# Patient Record
Sex: MALE | Race: WHITE | Employment: FULL TIME | URBAN - METROPOLITAN AREA
[De-identification: names, ages, dates, MRNs, and addresses within clinical notes are randomized per-mention and may not be internally consistent; named-entity substitution may affect disease eponyms.]

---

## 2022-11-15 ENCOUNTER — HOSPITAL ENCOUNTER (OUTPATIENT)
Dept: NUTRITION | Age: 37
Discharge: HOME | End: 2022-11-18
Payer: COMMERCIAL

## 2022-11-15 PROCEDURE — 97802 MEDICAL NUTRITION INDIV IN: CPT

## 2022-12-05 ENCOUNTER — HOSPITAL ENCOUNTER (OUTPATIENT)
Dept: NUTRITION | Age: 37
Discharge: HOME | End: 2022-12-08
Payer: COMMERCIAL

## 2022-12-05 PROCEDURE — 97803 MED NUTRITION INDIV SUBSEQ: CPT

## 2022-12-05 NOTE — PROGRESS NOTES
Lenard Miranda MS, RD, LD  Outpatient Registered Dietitian  [facility]  Phone: [phone]  Fax: [phone]    FOLLOW UP ASSESSMENT (12/5/22): Sierra Nails is a 43 y.o. male referred with the following diagnosis (es): Hyperlipidemia, unspecified [E78.5]     Previous SMART Goals:  Continue to track meals via MyFitnessPal: MET  Construct meals to abide by MyPlate Guidelines: Progressing  30 min HIIT training sessions 4 days / week (Mon-Thurs) + family walk on weekend:  MET  Limit saturated fat intake to <20g / day:  MET  Consume 35 - 40 g total fiber / day (5-10g soluble fiber):  MET  2g plant sterols / stanols / day:  MET    Diet Recall:  B:  1c mixed fruit (apples, bananas, blueberries)    L:  \"Mexican Bowl\" (1/4c lettuce + 3 oz grilled chicken + bell pepper + 1&1/2 c black beans + 1 c mexican rice)    D:  1 slice Red [place] pizza    Fluid intake: 64 oz water    ** Red meat intake <1x week since last encounter    Physical Activity:  4x 30 min HIIT sessions / week      Estimated Nutrition Needs:  MSJ x 1.3  (2413kcal/d)      Protein: 121g/day (20%)      DIAGNOSIS:  Unbalanced diet related to nutrition knowledge deficit as evidenced by eating pattern as above. INTERVENTION:  New SMART Goals:   Eliminate all trans fats from diet  Consume Protein source w/ q meal  Increase fatty fish intake to 2-3x per week    Nutrition Education and Counseling (30 minutes):   Specific Topics Discussed:   - Sources of trans fats and negative impact on lipid profile  - Sources of omega-3 FA's  - importance of regular protein intake w/ resistance training to maintain positive nitrogen balance. Patient voiced understanding and agreement.       MONITORING & EVALUATION:  Monitor Food and Nutrient Intake, Physical Activity and Function, Anthropometrics, and Biochemical    Follow up: DAVE Miranda MS, RD, CHRISTIAN  ______________________________________________________________________      Eileen Aguirre, MS, RD, LD  Outpatient Registered Dietitian  [facility]  Phone: [phone]  Fax: [phone]     ASSESSMENT  Dariana Herman is a 43 y.o. male referred with the following diagnosis (es): Hyperlipidemia, unspecified [E78.5]      PMH includes HLD. Surgical Hx: Mastoidectomy (), Labrum repair (), 3x knee scopes/repair (, , ), 2x broken arm repair (, ), ear drum reconstruction (), tonsilectomy/adenoctomy ()     Labs:   A1c: 5.5%  Total Chol: 260  T  HDL: 58  LDL: 171  AST: 47  ALT: 58     Meds: none     Patient stated goal: \"Control Cholesterol without the use of statins\"  . Eating behaviors and factors impacting food choices:   -Dining out more than 50% of meals  -Family food preferences  -Perceived time constraints  -Skips meals     Initial Diet Recall :   B:  1 oz apple cinnamon oatmeal     L:  532 [facility] sandwich w/ tomatoes, lettuce, oil&vinegar + 1/2 bag chips     D:  Steak bowl (rice black beans, steak, sauteed mixed veggies)     Fluid intake: 64 oz water / day     ** Red meat consumption ~3-4 x week     Eating pattern appears excessive in saturated fat and refined carbohydrate, and inadequate in monounsaturated fat, polyunsaturated fat, fiber, fruit, and calcium. Lifestyle/Family Influence/Support:   - Owner of \"forklift company\" (travels weekly)  -  w/ 2 children (wife is stay-at-home spouse)  - light drinker on weekends  - Family history of CVD     Movement includes ~ 3 days week HIIT resistance training @ home gym (30 min each session)  Stage of Change: Preparation. Anthropometrics:    Wt: 197.6 lb (89.82 kg)    BMI: 26.8 kg/m2  Ht: 6' (182.88 cm)      Estimated Nutrition Needs:  MSJ x 1.3  (2413kcal/d)      Protein: 121g/day (20%)      DIAGNOSIS:  Unbalanced diet related to nutrition knowledge deficit as evidenced by eating pattern as above. INTERVENTION:  RD Goal: Nutrition Related Labs WNL  RD Goal: Improved food variety  RD Goal: Compliance with CVD Prevention nutrition guidelines     Recommendations:  -5-10g from soluble fiber/day  -Limit of 20g saturated fat/day (<7% of kcal)  -Limit sodium to 2300mg/day  -25g+ fiber/day  -Increase phytonutrient intake with variety of color at meals  -Add omega 3 and omega 9 rich foods  -Limit red meat and highly processed foods  -Limit trans fat  -Structured meal times  -Track meals     SMART Goals:  Continue to track meals via [employer]  Construct meals to abide by MyPlate Guidelines  30 min HIIT training sessions 4 days / week (Mon-Thurs) + family walk on weekend  Limit saturated fat intake to <20g / day  Consume 35 - 40 g total fiber / day (5-10g soluble fiber)  2g plant sterols / stanols / day      Nutrition Education and Counseling (60 minutes):  Reviewed diet recall and provided recommendations as above. Heart Healthy nutrition principles reviewed including structured meal times, macronutrient balance at meals and snacks, benefits of increasing viscous fiber for lipid and glycemic management, reducing saturated fat intake and increasing omega 3 and omega 9 intake. Strategies provided. Demonstrated label reading. Materials Provided and Discussed:  \"Foods that fight cholesterol\"  \"CVD Prevention Guidelines\"  \"MyPlate Visual Resource\"     Behavior change strategies utilized: MI, HBM, Goal setting, Skill building  Patient verbalized understanding of recommendations discussed.      MONITORING & EVALUATION:  Monitor Food and Nutrient Intake, Physical Activity and Function, Anthropometrics, and Biochemical  Follow Up: Nov. 28th @ 1:00pm     Feng Borrego, MS, RD, LD